# Patient Record
Sex: FEMALE | Race: WHITE
[De-identification: names, ages, dates, MRNs, and addresses within clinical notes are randomized per-mention and may not be internally consistent; named-entity substitution may affect disease eponyms.]

---

## 2018-05-05 ENCOUNTER — HOSPITAL ENCOUNTER (EMERGENCY)
Dept: HOSPITAL 14 - H.ER | Age: 53
Discharge: HOME | End: 2018-05-05
Payer: COMMERCIAL

## 2018-05-05 VITALS
TEMPERATURE: 98 F | RESPIRATION RATE: 20 BRPM | DIASTOLIC BLOOD PRESSURE: 70 MMHG | OXYGEN SATURATION: 97 % | SYSTOLIC BLOOD PRESSURE: 122 MMHG | HEART RATE: 93 BPM
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
## 2018-05-05 DIAGNOSIS — Z79.84: ICD-10-CM
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
## 2018-05-05 DIAGNOSIS — E03.9: ICD-10-CM

## 2018-05-05 DIAGNOSIS — S06.0X0A: Primary | ICD-10-CM

## 2018-05-05 NOTE — CT
PROCEDURE:  CT HEAD WITHOUT CONTRAST.



HISTORY:

head truama



COMPARISON:

None available. 



TECHNIQUE:

Axial computed tomography images were obtained through the head/brain 

without intravenous contrast.  



Radiation dose:



Total exam DLP = 774.8 mGy-cm.



This CT exam was performed using one or more of the following dose 

reduction techniques: Automated exposure control, adjustment of the 

mA and/or kV according to patient size, and/or use of iterative 

reconstruction technique.



FINDINGS:



HEMORRHAGE:

No intracranial hemorrhage. 



BRAIN:

No mass effect or edema.  No atrophy or chronic microvascular 

ischemic changes. Left external capsule lacunar infarction. 



VENTRICLES:

Unremarkable. No hydrocephalus. 



CALVARIUM:

Unremarkable.



PARANASAL SINUSES:

Unremarkable as visualized. No significant inflammatory changes.



MASTOID AIR CELLS:

Unremarkable as visualized. No inflammatory changes.



OTHER FINDINGS:

None.



IMPRESSION:

No acute intracranial pathology.

## 2018-05-05 NOTE — ED PDOC
HPI:  Head Injury


Time Seen by Provider: 05/05/18 13:28


Chief Complaint (Nursing): Trauma


Chief Complaint (Provider): Head Injury


History Per: Patient


History/Exam Limitations: no limitations


Injury Occurred (Timing): Hours Ago: (ine)


Patient States: Struck With Object


Description Of Injury (Context): ladder fell approximately one foot onto pt 

head striking crown


Loss Of Consciousness: No


Additional History Per: Patient


Additional Complaint(s): 





Pt presents to the ED complaining of a headache after an aluminum extension 

ladder fell approximatley one foot onto the top of her head. Pt denies any 

focal neurological effects, denies numbness, tingling, visual deficiencies or 

disturbances, LOC, open wounds. Pt does complain of a headhead and a mild scalp 

hematoma at the crown of the skull





Past Medical History


Reviewed: Historical Data, Nursing Documentation


Vital Signs: 





 Last Vital Signs











Temp  98.0 F   05/05/18 13:13


 


Pulse  93 H  05/05/18 13:13


 


Resp  20   05/05/18 13:13


 


BP  122/70   05/05/18 13:13


 


Pulse Ox  97   05/05/18 13:13














- Medical History


PMH: Hypothyroidism





- Family History


Family History: States: Unknown Family Hx





- Home Medications


Home Medications: 


 Ambulatory Orders











 Medication  Instructions  Recorded


 


Atorvastatin [Lipitor] 40 mg PO DAILY 05/05/18


 


Levothyroxine [Synthroid] 150 mcg PO DAILY 05/05/18


 


metFORMIN [glucOPHAGE] 500 mg PO BID 05/05/18














- Allergies


Allergies/Adverse Reactions: 


 Allergies











Allergy/AdvReac Type Severity Reaction Status Date / Time


 


No Known Allergies Allergy   Verified 05/05/18 13:12














Review of Systems


ROS Statement: Except As Marked, All Systems Reviewed And Found Negative


Neurological: Positive for: Headache





Physical Exam





- Reviewed


Nursing Documentation Reviewed: Yes


Vital Signs Reviewed: Yes





- Physical Exam


Appears: Positive for: Well, Non-toxic, No Acute Distress


Head Exam: Positive for: ATRAUMATIC, NORMAL INSPECTION


Skin: Positive for: Normal Color, Dry


Eye Exam: Positive for: Normal appearance, EOMI, PERRL.  Negative for: Nystagmus

, Periorbital swelling, Periorbital tenderness, Conjunctival injection


Neck: Positive for: Normal, Painless ROM, Supple.  Negative for: Decreased ROM, 

Limited ROM, Pain On Movement Of Neck


Cardiovascular/Chest: Positive for: Regular Rate, Rhythm, Chest Non Tender.  

Negative for: Edema, Gallop, Murmur, Bradycardia, Tachycardia


Respiratory: Positive for: Normal Breath Sounds.  Negative for: Accessory 

Muscle Use, Crackles, Rales, Rhonchi, Stridor, Wheezing, Respiratory Distress


Pulses-Carotid (L): 2+


Pulses-Carotid (R): 2+


Pulses-Radial (L): 2+


Pulses-Radial (R): 2+


Back: Positive for: Normal Inspection.  Negative for: Vertebral Tenderness


Neurologic/Psych: Positive for: Alert, CNs II-XII (grossly intact), Oriented.  

Negative for: Motor/Sensory Deficits, Aphasia, Facial Droop





- ECG


O2 Sat by Pulse Oximetry: 97





Medical Decision Making


Medical Decision Making: 





apap and ibu


CT skull








CT RESULTS


********************************************************************************

***





PROCEDURE:  CT HEAD WITHOUT CONTRAST.





HISTORY:


head truama





COMPARISON:


None available. 





TECHNIQUE:


Axial computed tomography images were obtained through the head/brain without 

intravenous contrast.  





Radiation dose:





Total exam DLP = 774.8 mGy-cm.





This CT exam was performed using one or more of the following dose reduction 

techniques: Automated exposure control, adjustment of the mA and/or kV 

according to patient size, and/or use of iterative reconstruction technique.





FINDINGS:





HEMORRHAGE:


No intracranial hemorrhage. 





BRAIN:


No mass effect or edema.  No atrophy or chronic microvascular ischemic changes. 

Left external capsule lacunar infarction. 





VENTRICLES:


Unremarkable. No hydrocephalus. 





CALVARIUM:


Unremarkable.





PARANASAL SINUSES:


Unremarkable as visualized. No significant inflammatory changes.





MASTOID AIR CELLS:


Unremarkable as visualized. No inflammatory changes.





OTHER FINDINGS:


None.





IMPRESSION:


No acute intracranial pathology.





Disposition





- Clinical Impression


Clinical Impression: 


 Head injury, Concussion








- Patient ED Disposition


Is Patient to be Admitted: No


Doctor Will See Patient In The: Office


Counseled Patient/Family Regarding: Diagnosis, Need For Followup, Rx Given





- Disposition


Referrals: 


Amy Gonzales MD [Medical Doctor] - 


Disposition: Routine/Home


Disposition Time: 15:08


Condition: GOOD


Instructions:  Concussion in Adults, Concussion, Adult (DC), Minor Head Injury (

DC)


Forms:  [employer] (English)